# Patient Record
Sex: FEMALE | NOT HISPANIC OR LATINO | Employment: FULL TIME | URBAN - METROPOLITAN AREA
[De-identification: names, ages, dates, MRNs, and addresses within clinical notes are randomized per-mention and may not be internally consistent; named-entity substitution may affect disease eponyms.]

---

## 2020-07-31 ENCOUNTER — COMMUNICATION - HEALTHEAST (OUTPATIENT)
Dept: SCHEDULING | Facility: CLINIC | Age: 60
End: 2020-07-31

## 2021-06-10 NOTE — TELEPHONE ENCOUNTER
RN Triage:     Right knee injury.  Fell down the stairs.   Knee is painful. Per patient she heard a snap or a pop.   Advised [facility] to be seen.     Delores George RN, BSN [facility] Triage Nurse    Reason for Disposition    A 'snap' or 'pop' was heard at the time of injury    Additional Information    Negative: Major bleeding (actively dripping or spurting) that can't be stopped    Negative: Bullet, stabbed by knife or other serious penetrating wound    Negative: Looks like a dislocated joint (crooked or deformed)    Negative: Sounds like a life-threatening emergency to the triager    Negative: Wound looks infected    Negative: Skin is split open or gaping (length > 1/2 inch or 12 mm)    Negative: Bleeding won't stop after 10 minutes of direct pressure (using correct technique)    Negative: Dirt in the wound and not removed after 15 minutes of scrubbing    Negative: Sounds like a serious injury to the triager    Negative: Can't stand (bear weight) or walk    Negative: Looks infected (e.g., spreading redness, pus, red streak)    Negative: SEVERE pain (e.g., excruciating)    Negative: No prior tetanus shots (or is not fully vaccinated) and any wound (e.g., cut or scrape)    Negative: Patient wants to be seen    Negative: Injury interferes with work or school    Protocols used: KNEE INJURY-A-[place]

## 2024-02-18 ENCOUNTER — OFFICE VISIT (OUTPATIENT)
Dept: FAMILY MEDICINE | Facility: CLINIC | Age: 64
End: 2024-02-18

## 2024-02-18 VITALS — WEIGHT: 186.6 LBS | HEART RATE: 81 BPM | OXYGEN SATURATION: 96 % | TEMPERATURE: 98.1 F

## 2024-02-18 DIAGNOSIS — J02.9 ACUTE PHARYNGITIS, UNSPECIFIED ETIOLOGY: Primary | ICD-10-CM

## 2024-02-18 DIAGNOSIS — K11.5 SALIVARY GLAND STONE: ICD-10-CM

## 2024-02-18 PROCEDURE — 99203 OFFICE O/P NEW LOW 30 MIN: CPT | Performed by: FAMILY MEDICINE

## 2024-02-18 RX ORDER — AMOXICILLIN 500 MG/1
500 CAPSULE ORAL 3 TIMES DAILY
Qty: 30 CAPSULE | Refills: 0 | Status: SHIPPED | OUTPATIENT
Start: 2024-02-18 | End: 2024-02-28

## 2024-02-18 NOTE — PROGRESS NOTES
Annita Romero is a 63 year old female who comes in today for salivary gland stones.      Patient showed med   a picture of these on her phone    [place] [facility] [place] 1-3-24 for this    Now has painful sore throat.  Hurts to eat. Rash.     Has diabetes.    Throat pain for 4 days     Getting worse    Hurts to swallow      We used phone  line    ROS    Physical Exam  Constitutional:       Appearance: Normal appearance.   HENT:      Head: Normocephalic and atraumatic.      Right Ear: Tympanic membrane, ear canal and external ear normal.      Left Ear: Tympanic membrane, ear canal and external ear normal.      Nose: Nose normal.      Mouth/Throat:      Mouth: Mucous membranes are moist.      Comments: Very red posterior throat, mild swelling.  Quite enlarged and tender nodes in submandibular area.  No sinus tenderness.  Eyes:      Conjunctiva/sclera: Conjunctivae normal.   Neurological:      Mental Status: She is alert.       ASSESSMENT / PLAN:  (J02.9) Acute pharyngitis, unspecified etiology  (primary encounter diagnosis)  Comment: prudent to cover with antibiotics.  Exam very suspicious, symptoms quite bad.  Increase fluid intake.   Plan: amoxicillin (AMOXIL) 500 MG capsule        Follow up if symptoms not resolving    (K11.5) Salivary gland stone  Comment: this is more of an ongoing issue.  Throat is what caused her to come in today.   Plan: over the counter meds prn.  Follow up as needed.    Be seen promptly if symptoms acutely worsen       I reviewed the patient's medications, allergies, medical history, family history, and social history.    Nicholas Tovar MD

## 2024-02-18 NOTE — PATIENT INSTRUCTIONS
Take tylenol and/ or naproxen as needed    Take the antibiotics     Stay well hydrated    Follow up as needed based on symptoms     Be seen promptly if symptoms acutely worsen

## 2025-05-27 ENCOUNTER — OFFICE VISIT (OUTPATIENT)
Dept: URGENT CARE | Facility: URGENT CARE | Age: 65
End: 2025-05-27

## 2025-05-27 VITALS
SYSTOLIC BLOOD PRESSURE: 160 MMHG | TEMPERATURE: 98.1 F | WEIGHT: 185 LBS | OXYGEN SATURATION: 97 % | HEART RATE: 87 BPM | DIASTOLIC BLOOD PRESSURE: 88 MMHG | RESPIRATION RATE: 16 BRPM

## 2025-05-27 DIAGNOSIS — B02.9 HERPES ZOSTER WITHOUT COMPLICATION: Primary | ICD-10-CM

## 2025-05-27 PROCEDURE — 99213 OFFICE O/P EST LOW 20 MIN: CPT | Performed by: PHYSICIAN ASSISTANT

## 2025-05-27 RX ORDER — TRAMADOL HYDROCHLORIDE 50 MG/1
50 TABLET ORAL EVERY 6 HOURS PRN
Qty: 10 TABLET | Refills: 0 | Status: SHIPPED | OUTPATIENT
Start: 2025-05-27 | End: 2025-05-30

## 2025-05-27 NOTE — PROGRESS NOTES
Urgent Care Clinic Visit    Chief Complaint   Patient presents with    Leg Pain     Rt leg pain started 1 week ago. Dx shingle around back waist at [facility]. Some numbness. Seen [facility].              5/27/2025     6:45 PM   Additional Questions   Roomed by Shadi Liang MA   Accompanied by          Shadi Liang MA on 5/27/2025 at 6:46 PM

## 2025-05-28 NOTE — PROGRESS NOTES
URGENT CARE VISIT:    SUBJECTIVE:   Chief Complaint   Patient presents with    Leg Pain     Rt leg pain started 1 week ago. Dx shingle around back waist at [facility]. Some numbness. Seen [facility].      Annita Hinson is a 64 year old female who presents with a chief complaint of right leg pain.  Symptoms began 1 week(s) ago, are moderate and worsening  She was dx with shingles. She is now feeling n/t in right leg.   Pain exacerbated by nothing. Relieved by nothing.  She treated it initially with Valtrex and Medrol laina. This is the first time this type of injury has occurred to this patient.     PMH: No past medical history on file.  Allergies: Patient has no known allergies.   Medications:   Current Outpatient Medications   Medication Sig Dispense Refill    traMADol (ULTRAM) 50 MG tablet Take 1 tablet (50 mg) by mouth every 6 hours as needed for severe pain. 10 tablet 0     Social History:   Social History     Tobacco Use    Smoking status: Never    Smokeless tobacco: Never    Tobacco comments:      smoke outside   Substance Use Topics    Alcohol use: Not on file       ROS:  Review of systems negative except as stated above.    OBJECTIVE:  BP (!) 160/88   Pulse 87   Temp 98.1  F (36.7  C) (Tympanic)   Resp 16   Wt 83.9 kg (185 lb)   SpO2 97%   GENERAL APPEARANCE: healthy, alert and no distress  MUSCULOSKELETAL: NTTP over right leg. FROM left hip.   EXTREMITIES: peripheral pulses normal  SKIN: scabs present on left buttock and left thigh.   NEURO: sensation intact       ASSESSMENT:    ICD-10-CM    1. Herpes zoster without complication  B02.9 traMADol (ULTRAM) 50 MG tablet          PLAN:  Patient Instructions   Patient was educated on the natural course of condition and potential complications (post-herpetic neuralgia). Take medication as directed. Side effects discussed. She is currently on Valtrex and finished Medrol pack with continued symptoms. To prevent the spread make sure you avoid  direct contact with others. Keep rash covered if possible until rash has completely crusted over. Conservative measures include over-the-counter analgesics (Tylenol 500 mg or Ibuprofen 400 mg every 6 hours) and lidocaine patches. See your primary care provider if symptoms worsen or do not improve in 7 days. Seek emergency care if you develop severe pain/redness, shortness of breath, or confusion.    Patient verbalized understanding and is agreeable to plan. The patient was discharged ambulatory and in stable condition.    Charlene Estrada PA-C on 5/27/2025 at 7:26 PM

## 2025-05-28 NOTE — PATIENT INSTRUCTIONS
Patient was educated on the natural course of condition and potential complications (post-herpetic neuralgia). Take medication as directed. Side effects discussed. She is currently on Valtrex and finished Medrol pack with continued symptoms. To prevent the spread make sure you avoid direct contact with others. Keep rash covered if possible until rash has completely crusted over. Conservative measures include over-the-counter analgesics (Tylenol 500 mg or Ibuprofen 400 mg every 6 hours) and lidocaine patches. See your primary care provider if symptoms worsen or do not improve in 7 days. Seek emergency care if you develop severe pain/redness, shortness of breath, or confusion.

## 2025-07-21 ENCOUNTER — TRANSFERRED RECORDS (OUTPATIENT)
Dept: HEALTH INFORMATION MANAGEMENT | Facility: CLINIC | Age: 65
End: 2025-07-21

## 2025-07-21 ENCOUNTER — MEDICAL CORRESPONDENCE (OUTPATIENT)
Dept: HEALTH INFORMATION MANAGEMENT | Facility: CLINIC | Age: 65
End: 2025-07-21

## 2025-07-22 ENCOUNTER — TRANSCRIBE ORDERS (OUTPATIENT)
Dept: OTHER | Age: 65
End: 2025-07-22

## 2025-07-22 DIAGNOSIS — H34.8310 BRANCH RETINAL VEIN OCCLUSION OF RIGHT EYE WITH MACULAR EDEMA (H): Primary | ICD-10-CM
